# Patient Record
Sex: FEMALE
[De-identification: names, ages, dates, MRNs, and addresses within clinical notes are randomized per-mention and may not be internally consistent; named-entity substitution may affect disease eponyms.]

---

## 2023-09-20 ENCOUNTER — NON-APPOINTMENT (OUTPATIENT)
Age: 58
End: 2023-09-20

## 2024-12-16 ENCOUNTER — EMERGENCY (EMERGENCY)
Facility: HOSPITAL | Age: 59
LOS: 1 days | Discharge: HOME | End: 2024-12-16
Attending: EMERGENCY MEDICINE | Admitting: EMERGENCY MEDICINE
Payer: COMMERCIAL

## 2024-12-16 VITALS
RESPIRATION RATE: 18 BRPM | HEART RATE: 61 BPM | DIASTOLIC BLOOD PRESSURE: 70 MMHG | SYSTOLIC BLOOD PRESSURE: 96 MMHG | OXYGEN SATURATION: 97 % | WEIGHT: 293 LBS | HEIGHT: 66 IN | TEMPERATURE: 97 F

## 2024-12-16 VITALS
RESPIRATION RATE: 16 BRPM | TEMPERATURE: 98 F | SYSTOLIC BLOOD PRESSURE: 120 MMHG | DIASTOLIC BLOOD PRESSURE: 81 MMHG | OXYGEN SATURATION: 96 % | HEART RATE: 57 BPM

## 2024-12-16 DIAGNOSIS — R55 SYNCOPE AND COLLAPSE: ICD-10-CM

## 2024-12-16 DIAGNOSIS — R63.0 ANOREXIA: ICD-10-CM

## 2024-12-16 DIAGNOSIS — I10 ESSENTIAL (PRIMARY) HYPERTENSION: ICD-10-CM

## 2024-12-16 DIAGNOSIS — E78.5 HYPERLIPIDEMIA, UNSPECIFIED: ICD-10-CM

## 2024-12-16 DIAGNOSIS — I95.9 HYPOTENSION, UNSPECIFIED: ICD-10-CM

## 2024-12-16 DIAGNOSIS — R79.1 ABNORMAL COAGULATION PROFILE: ICD-10-CM

## 2024-12-16 LAB
ALBUMIN SERPL ELPH-MCNC: 3.8 G/DL — SIGNIFICANT CHANGE UP (ref 3.3–5)
ALP SERPL-CCNC: 92 U/L — SIGNIFICANT CHANGE UP (ref 40–120)
ALT FLD-CCNC: SIGNIFICANT CHANGE UP (ref 10–45)
ANION GAP SERPL CALC-SCNC: 12 MMOL/L — SIGNIFICANT CHANGE UP (ref 5–17)
AST SERPL-CCNC: SIGNIFICANT CHANGE UP (ref 10–40)
BASOPHILS # BLD AUTO: 0.05 K/UL — SIGNIFICANT CHANGE UP (ref 0–0.2)
BASOPHILS NFR BLD AUTO: 0.4 % — SIGNIFICANT CHANGE UP (ref 0–2)
BILIRUB SERPL-MCNC: 0.6 MG/DL — SIGNIFICANT CHANGE UP (ref 0.2–1.2)
BUN SERPL-MCNC: 16 MG/DL — SIGNIFICANT CHANGE UP (ref 7–23)
CALCIUM SERPL-MCNC: 9.7 MG/DL — SIGNIFICANT CHANGE UP (ref 8.4–10.5)
CHLORIDE SERPL-SCNC: 98 MMOL/L — SIGNIFICANT CHANGE UP (ref 96–108)
CO2 SERPL-SCNC: 22 MMOL/L — SIGNIFICANT CHANGE UP (ref 22–31)
CREAT SERPL-MCNC: 0.64 MG/DL — SIGNIFICANT CHANGE UP (ref 0.5–1.3)
D DIMER BLD IA.RAPID-MCNC: 434 NG/ML DDU — HIGH
EGFR: 102 ML/MIN/1.73M2 — SIGNIFICANT CHANGE UP
EOSINOPHIL # BLD AUTO: 0.04 K/UL — SIGNIFICANT CHANGE UP (ref 0–0.5)
EOSINOPHIL NFR BLD AUTO: 0.3 % — SIGNIFICANT CHANGE UP (ref 0–6)
FLUAV AG NPH QL: SIGNIFICANT CHANGE UP
FLUBV AG NPH QL: SIGNIFICANT CHANGE UP
GLUCOSE SERPL-MCNC: 127 MG/DL — HIGH (ref 70–99)
HCT VFR BLD CALC: 42.2 % — SIGNIFICANT CHANGE UP (ref 34.5–45)
HGB BLD-MCNC: 13.6 G/DL — SIGNIFICANT CHANGE UP (ref 11.5–15.5)
IMM GRANULOCYTES NFR BLD AUTO: 0.8 % — SIGNIFICANT CHANGE UP (ref 0–0.9)
LYMPHOCYTES # BLD AUTO: 1.29 K/UL — SIGNIFICANT CHANGE UP (ref 1–3.3)
LYMPHOCYTES # BLD AUTO: 9.6 % — LOW (ref 13–44)
MAGNESIUM SERPL-MCNC: 1.8 MG/DL — SIGNIFICANT CHANGE UP (ref 1.6–2.6)
MCHC RBC-ENTMCNC: 27.5 PG — SIGNIFICANT CHANGE UP (ref 27–34)
MCHC RBC-ENTMCNC: 32.2 G/DL — SIGNIFICANT CHANGE UP (ref 32–36)
MCV RBC AUTO: 85.3 FL — SIGNIFICANT CHANGE UP (ref 80–100)
MONOCYTES # BLD AUTO: 0.65 K/UL — SIGNIFICANT CHANGE UP (ref 0–0.9)
MONOCYTES NFR BLD AUTO: 4.9 % — SIGNIFICANT CHANGE UP (ref 2–14)
NEUTROPHILS # BLD AUTO: 11.25 K/UL — HIGH (ref 1.8–7.4)
NEUTROPHILS NFR BLD AUTO: 84 % — HIGH (ref 43–77)
NRBC # BLD: 0 /100 WBCS — SIGNIFICANT CHANGE UP (ref 0–0)
PLATELET # BLD AUTO: 286 K/UL — SIGNIFICANT CHANGE UP (ref 150–400)
POTASSIUM SERPL-MCNC: 3.5 MMOL/L — SIGNIFICANT CHANGE UP (ref 3.5–5.3)
POTASSIUM SERPL-MCNC: SIGNIFICANT CHANGE UP (ref 3.5–5.3)
POTASSIUM SERPL-SCNC: 3.5 MMOL/L — SIGNIFICANT CHANGE UP (ref 3.5–5.3)
POTASSIUM SERPL-SCNC: SIGNIFICANT CHANGE UP (ref 3.5–5.3)
PROT SERPL-MCNC: 8 G/DL — SIGNIFICANT CHANGE UP (ref 6–8.3)
RBC # BLD: 4.95 M/UL — SIGNIFICANT CHANGE UP (ref 3.8–5.2)
RBC # FLD: 14.6 % — HIGH (ref 10.3–14.5)
RSV RNA NPH QL NAA+NON-PROBE: SIGNIFICANT CHANGE UP
SARS-COV-2 RNA SPEC QL NAA+PROBE: SIGNIFICANT CHANGE UP
SODIUM SERPL-SCNC: 132 MMOL/L — LOW (ref 135–145)
TROPONIN T, HIGH SENSITIVITY RESULT: <6 NG/L — SIGNIFICANT CHANGE UP (ref 0–51)
WBC # BLD: 13.39 K/UL — HIGH (ref 3.8–10.5)
WBC # FLD AUTO: 13.39 K/UL — HIGH (ref 3.8–10.5)

## 2024-12-16 PROCEDURE — 99284 EMERGENCY DEPT VISIT MOD MDM: CPT | Mod: 25

## 2024-12-16 PROCEDURE — 71275 CT ANGIOGRAPHY CHEST: CPT | Mod: 26,MC

## 2024-12-16 PROCEDURE — 36415 COLL VENOUS BLD VENIPUNCTURE: CPT

## 2024-12-16 PROCEDURE — 87637 SARSCOV2&INF A&B&RSV AMP PRB: CPT

## 2024-12-16 PROCEDURE — 71045 X-RAY EXAM CHEST 1 VIEW: CPT | Mod: 26

## 2024-12-16 PROCEDURE — 99285 EMERGENCY DEPT VISIT HI MDM: CPT

## 2024-12-16 PROCEDURE — 84132 ASSAY OF SERUM POTASSIUM: CPT

## 2024-12-16 PROCEDURE — 93005 ELECTROCARDIOGRAM TRACING: CPT

## 2024-12-16 PROCEDURE — 80053 COMPREHEN METABOLIC PANEL: CPT

## 2024-12-16 PROCEDURE — 71045 X-RAY EXAM CHEST 1 VIEW: CPT

## 2024-12-16 PROCEDURE — 82962 GLUCOSE BLOOD TEST: CPT

## 2024-12-16 PROCEDURE — 85379 FIBRIN DEGRADATION QUANT: CPT

## 2024-12-16 PROCEDURE — 84484 ASSAY OF TROPONIN QUANT: CPT

## 2024-12-16 PROCEDURE — 93010 ELECTROCARDIOGRAM REPORT: CPT

## 2024-12-16 PROCEDURE — 71275 CT ANGIOGRAPHY CHEST: CPT | Mod: MC

## 2024-12-16 PROCEDURE — 85025 COMPLETE CBC W/AUTO DIFF WBC: CPT

## 2024-12-16 PROCEDURE — 83735 ASSAY OF MAGNESIUM: CPT

## 2024-12-16 RX ORDER — SODIUM CHLORIDE 9 MG/ML
1000 INJECTION, SOLUTION INTRAMUSCULAR; INTRAVENOUS; SUBCUTANEOUS ONCE
Refills: 0 | Status: COMPLETED | OUTPATIENT
Start: 2024-12-16 | End: 2024-12-16

## 2024-12-16 RX ADMIN — SODIUM CHLORIDE 1000 MILLILITER(S): 9 INJECTION, SOLUTION INTRAMUSCULAR; INTRAVENOUS; SUBCUTANEOUS at 14:50

## 2024-12-16 NOTE — ED PROVIDER NOTE - PATIENT PORTAL LINK FT
You can access the FollowMyHealth Patient Portal offered by [facility] by registering at the following website: [email or web address]. By joining [email or web address]’s FollowMyHealth portal, you will also be able to view your health information using other applications (apps) compatible with our system.

## 2024-12-16 NOTE — ED ADULT NURSE NOTE - NURSING NEURO ORIENTATION
Please transfer Rx's to [facility]   
Prescription approved per [facility] Refill Protocol.  KPavelRn  
oriented to person, place and time

## 2024-12-16 NOTE — ED ADULT TRIAGE NOTE - CHIEF COMPLAINT QUOTE
Pt c/o episode of dizziness and approx passed out for 13 seconds. Pt denies head strike and blood thinner use but does endorsed LOC for unknown amount of time. Pt able to currently ambulate with steady gait, ambulates with cane.

## 2024-12-16 NOTE — ED PROVIDER NOTE - OBJECTIVE STATEMENT
58 yo F PMH HLD, HTN presenting for evaluation of near syncope x 2 today.  Patient has had a cough for the past week, not eating as much as usual.  She was at OT getting therapy and suddenly felt very warm and lightheaded.  She was moved to where she could put her head down, was given water and ginger ale and felt well enough to finish the session (about 10 min).  The symptoms started again and she sat in the lobby while they called 911.  She did not fully pass out.  She restarted her cholesterol med 4 days ago and is not sure if this could be related.  2-3 days ago also felt a little lightheaded.  No cp, sob, abd pain, nvd, melena, brbpr, hematuria, other bleeding.  No palpitations recently, although occasionally has a flutter feeling in her chest--this has been over 2 years.  She scheduled herself to see a cardiologist in January just as a precaution in light of her HTN/HLD--wants to be checked to see if she needs cardiac testing .

## 2024-12-16 NOTE — ED PROVIDER NOTE - CLINICAL SUMMARY MEDICAL DECISION MAKING FREE TEXT BOX
58 yo F PMH HLD, HTN presenting for evaluation of near syncope x 2 today.  Patient noted to be hypotensive by EMS and in ER, but is otherwise well appearing.  It is likely vagal with the following contributing factors: recent URI with decreased po intake, warm room and she was wearing a woolen hat, had not eaten today.  Less likely cardiac in the absence of cp, palpitations, sob.  No specific risks for PE other than weight.  Will screen further with labs and check for anemia, electrolyte imbalance, trop leak or other abnormalities.  Will give fluids and reassess. 58 yo F PMH HLD, HTN presenting for evaluation of near syncope x 2 today.  Patient noted to be hypotensive by EMS and in ER, but is otherwise well appearing.  It is likely vagal with the following contributing factors: recent URI with decreased po intake, warm room and she was wearing a woolen hat, had not eaten today.  Less likely cardiac in the absence of cp, palpitations, sob.  No specific risks for PE other than weight.  Will screen further with labs and check for anemia, electrolyte imbalance, trop leak or other abnormalities.  Will give fluids and reassess.    18:00:  Patient is feeling better.  Ambulating without difficulty.   We discuss lab and CT reports in detail (had spoken to her before CT about that plan).  Explain finding on CT and need to bring report with her to her Cardiologist visit next month.  Discuss importance of f/u with PMD, need to monitor her symptoms and review return precautions in detail.  Patient verbalizes understanding of discussion and plan.

## 2024-12-16 NOTE — ED ADULT TRIAGE NOTE - AS PAIN REST
--------------- APPROVED REPORT --------------





EKG Measurement

Heart [id number]

MD 160P-4

[id number]

[id number]

[id number]



<Conclusion>

Sinus rhythm with premature supraventricular complexes

Nonspecific ST abnormality

Prolonged QT

Abnormal ECG

baseline artefact 7 (severe pain)

## 2024-12-16 NOTE — ED PROVIDER NOTE - PHYSICAL EXAMINATION
General:  Well appearing, no distress  HEENT:  No conjunctival injection, neck supple, no congestion   Chest:  Non-tender, no crepitance  Lungs:  Clear to auscultation bilaterally   Heart:  s1s2 normal, no murmur  Abdomen:  soft, non-tender, non-distended  :  Deferred  Rectal:  Deferred  Extremities: No edema, normal perfusion, no calf  joint swelling or tenderness  Neuro:  Alert, conversant, motor/sensory grossly intact

## 2024-12-16 NOTE — ED ADULT NURSE NOTE - NSFALLRISKINTERV_ED_ALL_ED

## 2024-12-16 NOTE — ED PROVIDER NOTE - NSFOLLOWUPINSTRUCTIONS_ED_ALL_ED_FT
YOU WERE EVALUATED TODAY FOR FEELING DIZZY AND ALMOST PASSING OUT.    THIS IS CALLED "NEAR-SYNCOPE"     THERE ARE MANY REASONS THIS CAN HAPPEN--IT CAN BE FROM DEHYDRATION, BEING OVERHEATED, NOT EATING, PAIN.    YOU DID NOT HAVE SIGNS OF A MORE SERIOUS CAUSE SUCH AS INFECTION OR BLEEDING.      IT IS VERY IMPORTANT FOR YOU TO SEE YOUR PRIMARY CARE DOCTOR THIS WEEK FOR A FOLLOW UP VISIT AND TO KEEP THE APPOINTMENT YOU HAVE WITH THE CARDIOLOGIST.  THEY SHOULD KNOW ABOUT THESE EPISODES AND SHOULD ALSO REVIEW YOUR RESULTS.      FOR NOW:  REST AND DRINK PLENTY OF FLUIDS  MONITOR YOUR SYMPTOMS  KEEP TRACK OF THE COLOR OF YOUR BOWEL MOVEMENTS-BLACK BOWEL MOVEMENTS CAN MEAN BLEEDING FROM THE STOMACH AND NEEDS TO BE CHECKED IMMEDIATELY   GET UP SLOWLY AFTER SITTING DOWN  IF YOU FEEL DIZZY, SIT OR LIE DOWN IMMEDIATELY AND ASK FOR HELP/CALL 911.    RETURN TO THE EMERGENCY ROOM IMMEDIATELY FOR:  FEVER  VOMITING  BLACK STOOL OR BLOOD IN THE STOOL OR URINE  CHEST PAIN  TROUBLE BREATHING  YOU FEEL YOUR HEART RACING OR SKIPPING BEATS  YOU AGAIN FEEL WEAK OR DIZZY OR FAINT  ANY NEW, WORSENING OR CONCERNING SYMPTOMS

## 2024-12-16 NOTE — ED ADULT NURSE NOTE - OBJECTIVE STATEMENT
60 yo female c/o weakness, syncope. Pt reports sh was in hand therapy and suddenly had a hot flash and thinks she passed out for a minute. Pt was sitting and was assisted by therapists, no reported headstrike or fall. Reports she has been feeling weak x2 days, cough x1 week. Denies fevers, nvd. AAOx4, NAD, ambulatory with cane.